# Patient Record
Sex: MALE | Race: WHITE | NOT HISPANIC OR LATINO | URBAN - METROPOLITAN AREA
[De-identification: names, ages, dates, MRNs, and addresses within clinical notes are randomized per-mention and may not be internally consistent; named-entity substitution may affect disease eponyms.]

---
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
## 2021-01-20 ENCOUNTER — OFFICE VISIT (OUTPATIENT)
Dept: URBAN - METROPOLITAN AREA CLINIC 33 | Facility: CLINIC | Age: 21
End: 2021-01-20

## 2021-01-20 ENCOUNTER — DASHBOARD ENCOUNTERS (OUTPATIENT)
Age: 21
End: 2021-01-20

## 2021-01-20 VITALS
WEIGHT: 265 LBS | OXYGEN SATURATION: 97 % | BODY MASS INDEX: 37.1 KG/M2 | DIASTOLIC BLOOD PRESSURE: 62 MMHG | HEART RATE: 69 BPM | SYSTOLIC BLOOD PRESSURE: 114 MMHG | HEIGHT: 71 IN

## 2021-01-20 RX ORDER — LORATADINE 10 MG
1 PACKET MIXED WITH 8 OUNCES OF FLUID TABLET,DISINTEGRATING ORAL ONCE A DAY
Qty: 30 | Status: ON HOLD | COMMUNITY

## 2021-01-20 RX ORDER — ASCORBIC ACID 125 MG
AS DIRECTED TABLET,CHEWABLE ORAL
Status: ACTIVE | COMMUNITY

## 2021-01-20 RX ORDER — HYDROCORTISONE ACETATE 25 MG/1
1 SUPPOSITORY SUPPOSITORY RECTAL BID
Qty: 20 | Refills: 0 | Status: ON HOLD | COMMUNITY
Start: 2020-11-18

## 2021-01-20 RX ORDER — TETRACYCLINE HCL 500 MG
AS DIRECTED CAPSULE ORAL
Status: ACTIVE | COMMUNITY

## 2021-01-20 RX ORDER — ACETAMINOPHEN 500 MG
AS DIRECTED TABLET ORAL
Status: ACTIVE | COMMUNITY

## 2021-01-20 NOTE — HPI-MIGRATED HPI
;     Change in Bowel habits : Patient presents today for follow up of change in bowel habits. He admits his bowel habit have returned to normal. He admits improvement of straining to have a BM since adding Benefiber, but since the improvement he has since stopped taking the Benefiber. Admits to taking the COVID Vaccine moi got his second dose on 01/18/202.  He denies continuing to take MiraLax and Culturelle daily he now takes them prn with 1 BM daily, with good quantity normal stools.  No melena, blood, or mucus.   He denies using the hydrocortisone suppositories prn, he states he is out of them.   Last visit (11/18/2020) Patient presents today for follow up of change in bowel habits. Currently admits 1-2 BMs per day. Stools are hard with strain without melena or mucus. Admits to rectal bleeding about two weeks ago after a strenuous bowel movement, blood was dark red in color only present on the tissue, not enough to change the color of the water. Admits to some rectal pain after strenuous bowel movements.    GI MD Note: On MiraLax and Culturelle he is having 1-2 BM's a day. Above episode happened only once since he started MiraLax. Occasionally, only if he has to strain, he will have a mild achy discomfort in anus.  Stool studies and labs as documented below. All WNL.    Last visit (10/20/2020) 20 year old female presents today for recent onset of change in bowel habits.   Onset was around August . Patient currently admits 1 bowel movements per day. Stools are soft mushy. Patient admits blood and mucus in stools and on the tissue.  Admits in August he had severe diarrhea but has since improved. Admits that the blood he seen in his stools was during this time and was more present on the tissue. The blood was a bright red color. Admits to currently having mucus in his stools and on the tissue.  Patient states that his previous bowel habits were 1-3 movements per day, with normal stools.   Patient denies aggravating or alleviating factors.  She has tried OTC Laxative and Probiotic's with no relief.  Patient admits associated bloating, and gas.    When he has a bowel movement, he does feel like he is completely emptying his bowels.  No fecal incontinence.   GI MD Note: In June patient had COVID. Symptoms then were only loss of taste. He quarantine for 2 weeks then repeat COVID test was negative. No antibiotics this year. 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 When he wiped, will see red streaks of blood and also sharp pain when wiping. No abdominal pain or fever with diarrhea which lasted 6 days.  No N/V. Diarrhea resolved and patient went back to [facility] 2.5 weeks ago, got constipated. Used Senokot, high fiber, Probiotic's, nothing was helping. On his own, BM's are starting to normalize with one BM a day but patient is seeing mucus with BM's and again has seen streaks of blood but only 2-3 times No weight loss Some decrease appetite but OK. No heartburn. No dysphagia Increased flatus No significant bloating. ;

## 2022-11-15 ENCOUNTER — OFFICE VISIT (OUTPATIENT)
Dept: URBAN - METROPOLITAN AREA CLINIC 35 | Facility: CLINIC | Age: 22
End: 2022-11-15